# Patient Record
Sex: MALE | Race: WHITE | HISPANIC OR LATINO | URBAN - METROPOLITAN AREA
[De-identification: names, ages, dates, MRNs, and addresses within clinical notes are randomized per-mention and may not be internally consistent; named-entity substitution may affect disease eponyms.]

---

## 2020-09-08 ENCOUNTER — EMERGENCY (EMERGENCY)
Facility: HOSPITAL | Age: 35
LOS: 1 days | Discharge: HOME | End: 2020-09-08
Attending: STUDENT IN AN ORGANIZED HEALTH CARE EDUCATION/TRAINING PROGRAM
Payer: MEDICAID

## 2020-09-08 VITALS
SYSTOLIC BLOOD PRESSURE: 140 MMHG | DIASTOLIC BLOOD PRESSURE: 82 MMHG | RESPIRATION RATE: 18 BRPM | HEIGHT: 66 IN | WEIGHT: 139.99 LBS | HEART RATE: 60 BPM

## 2020-09-08 VITALS — RESPIRATION RATE: 18 BRPM | OXYGEN SATURATION: 96 % | HEART RATE: 97 BPM

## 2020-09-08 PROCEDURE — 82962 GLUCOSE BLOOD TEST: CPT

## 2020-09-08 PROCEDURE — 99284 EMERGENCY DEPT VISIT MOD MDM: CPT

## 2020-09-08 PROCEDURE — 99285 EMERGENCY DEPT VISIT HI MDM: CPT

## 2020-09-08 NOTE — ED PROVIDER NOTE - PROGRESS NOTE DETAILS
Pt feeling better, walking without difficulty.  Pt wants to go home, but is noted to be tachycardic.  No tongue fasciculations or tremors.  Pt remains afebrile.  Pt states he will be taking an uber home.  Pt is drinking water and understands that we need to monitor him a bit longer.  - Emi Sharif, DO Pt's tachycardia improved, pt still requesting to be discharged. Appears clinically sober at this time, hast tolerated PO and ambulating steadily. Will DC.

## 2020-09-08 NOTE — ED PROVIDER NOTE - NSFOLLOWUPINSTRUCTIONS_ED_ALL_ED_FT
Please call or see your doctor or return to the ER if you have new chest pain, shortness of breath, fevers, inability to eat or drink, or worsening of symptoms that brought you to the emergency room today.    Consider cutting down on your drinking; if you need help with substance use call the phon e number above.      Please follow up with your primary care physician in 1-2 days or as soon as possible.

## 2020-09-08 NOTE — ED PROVIDER NOTE - PATIENT PORTAL LINK FT
You can access the FollowMyHealth Patient Portal offered by [facility] by registering at the following website: [email or web address]. By joining [facility]’s FollowMyHealth portal, you will also be able to view your health information using other applications (apps) compatible with our system.

## 2020-09-08 NOTE — ED ADULT TRIAGE NOTE - CHIEF COMPLAINT QUOTE
intox; brought in by police/ems; found on someone's lawn; awake; hand cuffed with police at bedside intox; brought in by police/ems; found on someone's lawn; ems reports pt was belligerant; awake; hand cuffed with police at bedside

## 2020-09-08 NOTE — ED PROVIDER NOTE - OBJECTIVE STATEMENT
35M denies pmh p/w intoxication.  Per EMS, patient was on the lawn of a home and the residents called 911.  Pt was found intoxicated and unable to safely walk on his own.  Pt arrives alert, visibly intoxicated, endorsing drinking 2-3 beers.  Pt reports he lives in a home in [place].  Denies any pain or complaints at this time and would like to go home.

## 2020-09-08 NOTE — ED ADULT NURSE NOTE - NSIMPLEMENTINTERV_GEN_ALL_ED
Implemented All Fall Risk Interventions:  [place] to call system. Call bell, personal items and telephone within reach. Instruct patient to call for assistance. Room bathroom lighting operational. Non-slip footwear when patient is off stretcher. Physically safe environment: no spills, clutter or unnecessary equipment. Stretcher in lowest position, wheels locked, appropriate side rails in place. Provide visual cue, wrist band, yellow gown, etc. Monitor gait and stability. Monitor for mental status changes and reorient to person, place, and time. Review medications for side effects contributing to fall risk. Reinforce activity limits and safety measures with patient and family.

## 2020-09-08 NOTE — ED PROVIDER NOTE - PHYSICAL EXAMINATION
Gen: NAD, alert, answers questions appropriately, visible intoxicated  Head: NCAT  HEENT: PERRL, oral mucosa moist, injected conjunctiva  Lung: CTAB, no respiratory distress  CV: tachycardic, no murmurs, Normal perfusion  Abd: soft, NTND  MSK: No edema, no visible deformities  Neuro: No focal neurologic deficits, moving all extremities  Skin: No rash

## 2020-09-08 NOTE — ED ADULT NURSE NOTE - CHIEF COMPLAINT QUOTE
intox; brought in by police/ems; found on someone's lawn; ems reports pt was belligerant; awake; hand cuffed with police at bedside

## 2020-09-08 NOTE — ED ADULT NURSE NOTE - CAS EDN DISCHARGE ASSESSMENT
Alert and oriented to person, place and time/As per Jacy MOSLEY, Pt cleared for discharge, Jacy MOSLEY DC, Pt discharged with steady gait to waiting room

## 2020-09-08 NOTE — ED PROVIDER NOTE - NSFOLLOWUPCLINICS_GEN_ALL_ED_FT
[facility] of Substance Abuse  Psychiatry Substance Abuse  [id number] [place] [address]  Phone: [phone]  Fax:   Follow Up Time:

## 2020-09-08 NOTE — ED PROVIDER NOTE - CLINICAL SUMMARY MEDICAL DECISION MAKING FREE TEXT BOX
35M denies pmh p/w intoxication.  Per EMS, patient was on the lawn of a home and the residents called 911.  Pt was found intoxicated and unable to safely walk on his own.  Pt arrives alert, visibly intoxicated, endorsing drinking 2-3 beers.  Pt reports he lives in a home in [place].  Denies any pain or complaints at this time and would like to go home.  Arrives visibly intoxicated, but answering questions appropriately, heart tachycardic, no neuro deficits and patient moving all extremities, no outward signs of trauma.  Will give food and water, monitor for sobriety.  - Emi Sharif, DO

## 2020-09-08 NOTE — ED ADULT NURSE NOTE - OBJECTIVE STATEMENT
35 yr old M arrived to the ED via EMS after being found intoxicated on someone's front lawn. per EMS they woke the pt who then became belligerent and was restrained by police. upon arrival pt is a&ox2, speaking clearly in [place]. lungs clear madelyn. respirations are even and unlabored. abd is soft, non tender. pt has abrasions to wrists madelyn from cuffs from police from prior to arrival. pt denies fever, chills, chest pain, or sob. pt states he only drinks two beers a day.

## 2021-02-23 NOTE — ED ADULT NURSE NOTE - DRUG PRE-SCREENING (DAST -1)
Darren Fifi calling to tell you that he had a reaction to the first vaccine 2 weeks ago, on Feb 12th. Due for second one on March 3rd. He immediately became flushed and his throat began to swell. He was given benadryl and began to feel better within a few minutes. He was instructed to go to the ER, which he did. He is asking if he should avoid getting the second one? Statement Selected
